# Patient Record
Sex: MALE | URBAN - METROPOLITAN AREA
[De-identification: names, ages, dates, MRNs, and addresses within clinical notes are randomized per-mention and may not be internally consistent; named-entity substitution may affect disease eponyms.]

---

## 2017-03-30 PROBLEM — 422400008 VOMITING: Status: ACTIVE | Noted: 2017-03-30

## 2017-04-17 PROBLEM — 196735001 CSG - CHRONIC SUPERFICIAL GASTRITIS: Status: ACTIVE | Noted: 2017-04-17

## 2017-09-14 PROBLEM — 271737000 ANEMIA: Status: ACTIVE | Noted: 2017-03-30

## 2018-02-22 PROBLEM — 428283002 HISTORY OF POLYP OF COLON (SITUATION): Status: ACTIVE | Noted: 2018-02-22

## 2018-04-05 PROBLEM — 39772007 RECTAL POLYP: Status: ACTIVE | Noted: 2018-04-05

## 2018-04-05 PROBLEM — 91985000 BENIGN NEOPLASM OF ASCENDING COLON: Status: ACTIVE | Noted: 2018-04-05

## 2018-04-05 PROBLEM — 92343006 BENIGN NEOPLASM OF SIGMOID COLON: Status: ACTIVE | Noted: 2018-04-05

## 2018-04-05 PROBLEM — 166318006 BLOOD CHEMISTRY ABNORMAL: Status: ACTIVE | Noted: 2017-09-17

## 2018-04-05 PROBLEM — 92448001 BENIGN NEOPLASM OF TRANSVERSE COLON: Status: ACTIVE | Noted: 2018-04-05

## 2018-04-05 PROBLEM — 92076000 BENIGN NEOPLASM OF DESCENDING COLON: Status: ACTIVE | Noted: 2018-04-05

## 2021-07-06 ENCOUNTER — TELEPHONE ENCOUNTER (OUTPATIENT)
Dept: URBAN - METROPOLITAN AREA CLINIC 35 | Facility: CLINIC | Age: 81
End: 2021-07-06

## 2021-07-06 ENCOUNTER — OFFICE VISIT (OUTPATIENT)
Dept: URBAN - METROPOLITAN AREA CLINIC 31 | Facility: CLINIC | Age: 81
End: 2021-07-06

## 2021-07-06 VITALS
WEIGHT: 166 LBS | DIASTOLIC BLOOD PRESSURE: 75 MMHG | OXYGEN SATURATION: 98 % | BODY MASS INDEX: 24.59 KG/M2 | HEIGHT: 69 IN | HEART RATE: 68 BPM | SYSTOLIC BLOOD PRESSURE: 130 MMHG

## 2021-07-06 PROBLEM — 34742003 PORTAL HYPERTENSION: Status: ACTIVE | Noted: 2020-01-13

## 2021-07-06 PROBLEM — 197321007 FATTY LIVER: Status: ACTIVE | Noted: 2020-01-13

## 2021-07-06 PROBLEM — 39839004 DIAPHRAGMATIC HERNIA: Status: ACTIVE | Noted: 2017-09-17

## 2021-07-06 PROBLEM — 14760008: Status: ACTIVE | Noted: 2019-12-19

## 2021-07-06 PROBLEM — 235675006: Status: ACTIVE | Noted: 2019-12-06

## 2021-07-06 PROBLEM — 40930008: Status: ACTIVE | Noted: 2019-12-05

## 2021-07-06 PROBLEM — 88111009 CHANGE IN BOWEL HABIT: Status: ACTIVE | Noted: 2019-11-11

## 2021-07-06 PROBLEM — 274774002 ELEVATED LEVELS OF TRANSAMINASE & LACTIC ACID DEHYDROGENASE: Status: ACTIVE | Noted: 2017-09-17

## 2021-07-06 PROBLEM — 441988000 IMAGING OF ABDOMEN ABNORMAL: Status: ACTIVE | Noted: 2019-12-20

## 2021-07-06 PROBLEM — 2901004: Status: ACTIVE | Noted: 2019-12-19

## 2021-07-06 PROBLEM — 60728008: Status: ACTIVE | Noted: 2019-11-11

## 2021-07-06 PROBLEM — 87522002 IRON DEFICIENCY ANEMIA: Status: ACTIVE | Noted: 2017-09-14

## 2021-07-06 PROBLEM — 442076002 EARLY SATIETY: Status: ACTIVE | Noted: 2017-03-30

## 2021-07-06 RX ORDER — LINACLOTIDE 72 UG/1
1 CAPSULE CAPSULE, GELATIN COATED ORAL ONCE A DAY
Qty: 90 CAPSULE | Refills: 1 | Status: ON HOLD | COMMUNITY
Start: 2019-12-06

## 2021-07-06 RX ORDER — LUBIPROSTONE 8 UG/1
1 CAPSULE WITH FOOD AND WATER CAPSULE, GELATIN COATED ORAL TWICE A DAY
Qty: 60 | Status: ON HOLD | COMMUNITY
Start: 2019-11-13

## 2021-07-06 RX ORDER — HYOSCYAMINE SULFATE 0.125 MG
1 TABLET ON THE TONGUE AND ALLOW TO DISSOLVE  AS NEEDED TABLET,DISINTEGRATING ORAL
Qty: 60 | Refills: 1 | Status: ON HOLD | COMMUNITY
Start: 2019-12-19

## 2021-07-06 RX ORDER — MECOBALAMIN 1000 MCG
AS DIRECTED TABLET,CHEWABLE ORAL
Status: ON HOLD | COMMUNITY

## 2021-07-06 RX ORDER — HYOSCYAMINE SULFATE 0.12 MG/1
1 TABLET AS NEEDED TABLET ORAL
OUTPATIENT
Start: 2019-11-11

## 2021-07-06 RX ORDER — HYOSCYAMINE SULFATE 0.12 MG/1
1 TABLET AS NEEDED TABLET ORAL
Qty: 60 TABLET | Refills: 1 | Status: ON HOLD | COMMUNITY
Start: 2019-11-11

## 2021-07-06 RX ORDER — LEVOTHYROXINE SODIUM 0.15 MG/1
1 TABLET IN THE MORNING ON AN EMPTY STOMACH TABLET ORAL ONCE A DAY
Status: ACTIVE | COMMUNITY

## 2021-07-06 RX ORDER — NITROGLYCERIN 0.4 MG/1
TABLET SUBLINGUAL PRN
Status: ON HOLD | COMMUNITY

## 2021-07-06 RX ORDER — LINACLOTIDE 72 UG/1
1 CAPSULE CAPSULE, GELATIN COATED ORAL ONCE A DAY
OUTPATIENT
Start: 2019-12-06

## 2021-07-06 RX ORDER — LINACLOTIDE 72 UG/1
1 CAPSULE CAPSULE, GELATIN COATED ORAL ONCE A DAY
Qty: 60 | Status: ON HOLD | COMMUNITY
Start: 2019-07-11

## 2021-07-06 RX ORDER — AMIODARONE HYDROCHLORIDE 200 MG/1
1 TABLET TABLET ORAL ONCE A DAY
Qty: 30 | Status: ACTIVE | COMMUNITY

## 2021-07-06 RX ORDER — PANTOPRAZOLE SODIUM 40 MG/1
1 TABLET TABLET, DELAYED RELEASE ORAL ONCE A DAY
Status: ACTIVE | COMMUNITY

## 2021-07-06 RX ORDER — PANTOPRAZOLE SODIUM 40 MG/1
1 TABLET TABLET, DELAYED RELEASE ORAL ONCE A DAY
OUTPATIENT

## 2021-07-06 RX ORDER — ATORVASTATIN CALCIUM 80 MG/1
TABLET, FILM COATED ORAL
Qty: 90 UNSPECIFIED | Status: ACTIVE | COMMUNITY

## 2021-07-06 NOTE — HPI-MIGRATED HPI
Melena : Patient denies any episodes of melena since his last office visit.   Last visit (1/13/2020) Denies any episodes since Dec. 19, 2019.   Last visit (2019) Admit black colored  stools since the past one week .  He denies ingestion of iron and pepto bismol.;

## 2021-07-06 NOTE — HPI-MIGRATED HPI
;   ;   ;   ;   ;   ;   ;   ;   ;     Anemia : Patient admits any recent labs completed since his last office visit.   Last visit (1/13/2020)  Most recent labs completed at [facility] (12/24/2019) RBC (Low) 3.87, Hgb (Low) 13.5, Hct (Low) 41.1   Last visit (12/5/2019)Denies any labs since Oct. 17, 2019.  Last visit (11/26/2019) Most recent labs completed with PCP on Oct. 17, 2019 with Thyroid levels only.  Denies fatigue, dizziness/lightheadedness, melena, blood in stool or abdominal pain.   Outside Labs:PCP (8/8/2019)Iron (Low) at 28, Sat % (Low) at 6, Iron Binding Cap (WNL) 435.   Last visit (11/11/2019)  Patient presents today for a follow up of anemia, which he was initially found to have via labs at [facility] ER April 6, 2015, at which time patient presented with complaints of heart palpitations.     Most recent labs with PCP Oct. 2019 Hgb 13.5  (reported by patient) (results requested).  Denies fatigue, dizziness/lightheadedness, melena, blood in stool or abdominal pain. Patient d/c the Ferrous Sulfate Tablet, 325 (65 Fe) MG 2 months ago due to it causing constipation.    Last visit (7/11/2019)  Patient has a history of anemia.    Last visit (11/1/2018) Denies fatigue, dizziness/lightheadedness, melena, blood in stool or abdominal pain. Patient continues to take Ferrous Sulfate Tablet, 325 (65 Fe) MG.    04/05/2018 He continue to take Ferrous Iron 325 mg and Vitamin B12 1 QD once daily. Most recent labs with PCP 2/17/2018 with Hem.A1C, CMP. Feb. 1, 2018 with CBC: Hgb (Low) at 13.0.  Denies fatigue, dizziness/lightheadedness, melena, blood in stool or abdominal pain.  Admit having 1 bowel movement per day with normal formed stools. Denies melena, blood or in stools.  Stools are really dark, which he attribute to the use of iron daily.   At last visit (2/22/2018)He presented to [facility] Nov. 14, 2017 with chest pain. During his stay his Hgb was Low at 6.  He admit having two blood transfusions, B12 injection x 2, colonoscopy, EGD and capsule endoscopy.  Most recent labs with PCP Feb. 16, 2018 with Hgb (WNL) at 13 and Iron levels were within normal limits  (reported by patient).  He was also admitted in October for chest pain and was evaluated by Dr Chucky Morgan   He has been taking Ferrous Iron 325 mg and Vitamin B12 1 QD once daily since d/c from hospital.   Denies symptoms of fatigue or dizziness/lightheadedness, denies melena. Denies blood in stool. He denies abdominal pain.  Admit having 1 bowel movement per day with normal formed stools. Denies melena, blood or in stools.  Stools are really dark, which he attribute to the use of iron daily.  At last visit (9/14/2017) Patient denies symptoms of fatigue or dizziness/lightheadedness, denies melena. Denies blood in stool. He denies abdominal pain. Patient is taking Geritol Tonic daily for vitamin and iron supplementation, though he states he has not been taking it regularly as of late.   Visit (7/16/2015) Patient presented to [facility] ER on April 6, 2015 with complaints of palpitations. He mentioned that he had suffered nausea and vomiting from food poisoning 3-4 nights prior. Routine labs showed patient to be anemic. He followed up with his pcp, Dr. Hinojosa, who ran labs and patient's hemoglobin was reportedly at a low of 8.   Patient began taking a Hume vitamin to help with anemia. About 3 weeks ago, patient had follow up labs once again, and hemoglobin continued to be low. He presents today for consultation. He does take Ibuprofen 200 mg 4 tabs po bid once a month for gout          Patient denies symptoms of fatigue or dizziness/lightheadedness since follow up visit with his pcp, or any melena ever.    LABS (4/6/15) CBC showed RBC LOW at 2.91, Hemoglobin LOW at 10.1, Hematocrit LOW at 28.5, and MCH HIGH at 34.7; CMP showed BUN HIGH at 31, Creatinine LOW at 0.7, Potassium LOW at 3.0, Chloride LOW at 98, AST HIGH at 49, and BUN/Creatinine Ratio HIGH at 44.;   Early Satiety : Patient denies continued episodes of early satiety. Patient admits that he eats a whole lot less than before. He also stated that he is not sure if he gets full fast, he's just aware that he's been eating small meals.   Last visit (1/13/2020)  He continue to admit early satiety since his last office visit.    Last visit (12/19/2019) He admits that he still experience early satiety.   Last visit (12/5/2019)He continue to experience symptoms throughout the day.   Last visit (11/26/2019) Patient presents today to review Gastric Emptying study and follow up of early satiety.  He continue to experience symptoms throughout the day.   Last visit (11/11/2019) Continue to admit continuous fullness sensation.   Last visit (7/11/2019) Patient admits that he always feels full even when he hasn't eaten.   Last visit (11/1/2018) Patient states he is able to consume a small meal without complication.   Last visit (4/5/2018) He continue to eat normal meals throughout the day, but not large as he used too.    At last visit (2/22/2018) He has been eating normal meals throughout the day, but not large as he used to.    At last visit (9/14/2017)Patient continues to admit early satiety, with onset over 6 years ago. He states that has never been able to eat large amounts.  He does have abdominal distention possibly due to the weight gain;   Bloating/Gas : 81 year old male patient presents today for a follow up of bloating and gas. Patient admits continuous episdoes since his last office visit. Patient admits symptoms are more present at night while patient is asleep. He states that it usually wakes him up from his sleep.  Patient admits symptoms have been present since 4-5 months. Patient admits taking any OTC Pepcid, baking soda with water, Gas -X, Gas relief and Pepto- bismol to relieve symptoms.   Patient admits to having some labs done back in March of 2021, not what was initially.   Patient denies having any previous breath test including H. Pylori or SIBO    Last visit (1/13/2020)  Patient presents today for a follow up of an EGD performed by Dr. Karin Lowe.  He denies any complications after his procedure.    Admit 50% gradual improvement of bloating and gas. He will occasionally take 2 Tums with relief.   He has not taken Hyoscyamine since he has not experience abdominal pain or cramping.    Last visit (12/19/2019) Patient presents today to review CT and follow up of bloating and gas.  He takes a [facility] brand heartburn medication     Last visit (12/5/2019)He continue to experience generalized abdominal distention and increase gas.  Described as abdominal distention with sensation of fullness and tightness which is painful.   Last visit (11/26/2019) He continue to experience generalized abdominal distention and increase gas.   Described as abdominal fullness throughout the day.  Denies any aggravating factors.  Last visit (11/11/2019) Continue to experience generalized abdominal distention and increase gas.  Denies any aggravating factors.    He will take 2 Gas-X when he "feel uncomfortable" with mild relief.    Last visit (7/11/2019) Patient admits bloating and he denies any N/V.;   Nausea/Vomiting : Patient admits to episodes of nausea/vomiting over the past 2 months which occured twice.    Last visit (1/13/2020) He continue to deny any episodes of nausea or vomiting.  Last visit (12/19/2019) Patient denies any episodes of nausea or vomiting since his last office visit.   Last visit (12/5/2019)Continue to deny episodes of nausea or vomiting.   Last visit (11/26/2019) Continue to deny episodes of nausea or vomiting.   Last visit (11/11/2019) Continue to deny episodes of nausea or vomiting.    Last visit (7/11/2019) Patient admits that N/V has resolved.   Last visit (11/1/2018) Patient denies recent episodes of emesis.  Last visit (4/5/2018) Continue to deny episodes of emesis.  At last visit (2/22/2018) Continue to deny episodes of emesis.   At last visit (9/14/2017)Patient admits 1 episode of emesis last night. Denies any nausea. While he was laying in bed he felt liquid coming up his throat. He states he had eaten a spicy soup earlier in the day.   Denies any other episodes like this in the past 6 months.;   Carrasco's Esophagus : Patient admits any associated symptoms of Carrasco's Esophagus at this time. Patient denies symptoms are controlled with the use of Pantoprazole 40 mg QD.   Last visit (1/13/2020)  Patient denies any associated symptoms Carrasco's Esophagus at this time. He continue to take Pantoprazole 40 mg QD.   Last visit (12/19/2019) He admits the use of Pantoprazole 40 mg QD to help control his symptoms.   Last visit (12/5/2019)He continues to take Pantoprazole 40 mg po qd. Denies heartburn or reflux symptoms.   Last visit (11/11/2019)  He continues to take Pantoprazole 40 mg po qd. Denies heartburn or reflux symptoms.   Last visit (7/11/2019)  He continues to take Pantoprazole 40 mg po qd.    Last visit (11/1/2018)  Patient presents today for a follow up of Carrasco's esophagus, which he was fond to have via EGD performed (12/20/2014). He denies recent symptoms. He continues to take Pantoprazole 40 mg po qd.    Last visit (4/5/2018) Continue to admit control of symptoms with the use of Pantoprazole 40 mg daily. Denies heartburn/reflux symptoms.   At last visit (2/22/2018) Continue to take Pantoprazole 40 mg daily.  Denies heartburn/reflux symptoms.  At last visit (9/14/2017)Patient continues to take Pantoprazole 40 mg daily with good control of acid reflux symptoms. He denies any symptoms of heartburn, chest pains, or dysphagia. Denies epigastric pain.;   Cough : Patient denies a continued cough since his last office visit.    Last visit (1/13/2020)  He admit less frequent episodes since last  visit.   Last visit (12/19/2019) Patient admits that he will only cough in the morning and he feels that this is associated with sinus drainage. He denies any cough later in the day.   Last visit (12/5/2019)Denies coughing episodes since last visit.  Last visit (11/26/2019) Denies coughing episodes since last visit.  Last visit (11/11/2019) Admit periodic cough since last visit.  Had a Chest Xray 2 weeks ago with normal findings (reported by patient), due to cough and chest congestion.  Subsequently treated with a Zpack.     Last visit (7/11/2019) Patient admits that his cough is getting better.  Last visit (11/1/2018) Patient marks improvement of coughing episodes. He states attributes symptoms to allergies and symptoms only occur at night.  Last visit (4/5/2018) Continue to admit intermittent coughing episodes, which he attribute to post nasal drainage associated with his seasonal allergies.  He is taking Claritin and a decongestant with improvement    At last visit (2/22/2018) Admit intermittent coughing episodes over the past month, which he attribute to post nasal drainage associated with his seasonal allergies.  At last visit (9/14/2017)Patient admits intermittent cough with associated globus and mucus production in the throat.;   Abnormal Liver Function : Patient admits any recent labs completed since his last office visit.   Last visit (1/13/2020)  Most recent labs showing elevated liver enzymes completed at [facility] (12/20/2019) with AST (High) at 45, ALT 47, Alk phos 85, Total Bilirubin 0.83 3 glasses of wine every night since the past 5 years. Had been drinking vodka and scotch daily - 3 drinks / night - 20 years    Last visit (12/19/2019) Patient has a history of elevated liver enzymes.  Denies any labs since Oct. 17, 2019.   Last visit (11/26/2019) Most recent labs completed with PCP on Oct. 17, 2019 with Thyroid levels only.    Last visit (11/11/2019) Patient has a history of elevated liver enzymes.  Most recent labs completed with PCP in Oct. 2019.  Continue to deny symptoms of jaundice, chills, RUQ pains, dizziness, fatigue.  Admits longstanding history of easy bruising. Patient does take Plavix 75 mg daily.     Last visit (7/11/2019) Patient has a history of elevated liver enzymes.   Last visit (11/1/2018)  Patient denies having recent labs completed. Patient currently jaundice, chills, RUQ pains, dizziness, fatigue.  Admits longstanding history of easy bruising. Patient does take Plavix 75 mg daily.    04/05/2018 Patient's labs from 3/31/17 revealed elevated liver enzymes. Records from Dr. Hinojosa' office show elevated liver enzymes as far back as July 2016.  Patient currently jaundice, chills, RUQ pains, dizziness, fatigue.  Admits longstanding history of easy bruising. Patient does take Plavix 75 mg daily.   RISK FACTORS:   Patient admits nightly alcohol consumption for the past 30 years- usually a few beers and a glass of red wine. 10+ years ago he would drink scotch and vodka. Denies drugs, Denies travel outside the [place]. He does use Aspirin 81 mg qd. He uses BC powder (Aspirin) about twice per week for hip pains. Denies protein supplements, Denies tattoos, Denies piercing's, Admits  service in the [employer] from [id number] though he was never deployed.  Denies blood transfusion, Denies family history of liver disease or cancer, Denies personal exposure to liver diseases, Denies halfway, Denies rehab.;   Change in Bowel habits : Patient admits bowel habits have returned to normal since his last office visit. Patient reports 1 bowel movement a day. Stools are normal and formed. Patient denies any episodes of rectal bleeding, melena, or mucus at this time.   Last visit (1/13/2020)  Currently admit 2-3 bowel movements at leat 4 days a week with the use of Linzess 72 mcg 1 QD.  Stools are normal and formed to loose pieces.  Denies melena, blood or mucus in stools.    Last visit (12/19/2019) Patient admits that his bowel movements are returning to normal. Patient admits that he will have 1-2 bowel movements a day. He reports that he still has to strain occasionally. He admits that his stools are form. Patient admits the use of Linzess 72 mcg 1 QD.  Last visit (12/5/2019) Patient presents today with worsening constipation.  He report having 1 incomplete strenuous bowel movement every 2-3 days.  Stools are hard and semi-formed.  Denies melena, blood or mucus in stools.  He admit associated generalized abdominal distention with sensation of fullness and tightness.  Admit increase gas.    He has been taking the samples of Amitiza 24 MCG, 1 BID without benefit, so he stopped taking them.    Last visit (11/26/2019) He continue to admit 1 incomplete strenuous bowel movement twice a week.  Stools are hard pebble-like or hard semi formed.  Denies melena, blood or mucus in stools.  He completed samples of Amitiza 8 MCG, 1 BID x 8 days without benefit.  Last visit (11/11/2019) Currently admit 1 incomplete strenuous bowel movement twice a week.  Stools are hard pebble-like or hard semi formed.  Denies melena, blood or mucus in stools.  He tried the Linzess 72 mcg, 1 QD, but d/c due to "explosive diarrhea". He has not taken any medication for symptoms over the past three months    Last visit (7/11/2019) Patient presents today for a consultation of change in bowel habits since the past one month.   Will be starting a new medication for CAD / PVD to replace Plavix which was started about 4 weeks ago   Patient admits 1 BM a week. Patient admits that stools are hard and pebble like. Patient denies any rectal bleeding, melena, rectal pain, or pruritus ani.   Patient admits taking a laxative 2 days ago and he was able to have a BM yesterday.   Patient admits continued use of fiber pills daily.;

## 2021-07-08 ENCOUNTER — TELEPHONE ENCOUNTER (OUTPATIENT)
Dept: URBAN - METROPOLITAN AREA CLINIC 35 | Facility: CLINIC | Age: 81
End: 2021-07-08

## 2021-07-08 RX ORDER — HYOSCYAMINE SULFATE 0.12 MG/1
1 TABLET AS NEEDED TABLET ORAL
Qty: 120 TABLET | Refills: 0 | OUTPATIENT
Start: 2019-11-11

## 2021-09-08 ENCOUNTER — TELEPHONE ENCOUNTER (OUTPATIENT)
Dept: URBAN - METROPOLITAN AREA CLINIC 35 | Facility: CLINIC | Age: 81
End: 2021-09-08

## 2021-09-09 ENCOUNTER — OFFICE VISIT (OUTPATIENT)
Dept: URBAN - METROPOLITAN AREA CLINIC 33 | Facility: CLINIC | Age: 81
End: 2021-09-09

## 2021-09-09 VITALS
SYSTOLIC BLOOD PRESSURE: 132 MMHG | HEART RATE: 69 BPM | DIASTOLIC BLOOD PRESSURE: 60 MMHG | BODY MASS INDEX: 24.44 KG/M2 | OXYGEN SATURATION: 94 % | WEIGHT: 165 LBS | HEIGHT: 69 IN

## 2021-09-09 RX ORDER — LINACLOTIDE 72 UG/1
1 CAPSULE CAPSULE, GELATIN COATED ORAL ONCE A DAY
Qty: 60 | Status: ON HOLD | COMMUNITY
Start: 2019-07-11

## 2021-09-09 RX ORDER — HYOSCYAMINE SULFATE 0.12 MG/1
1 TABLET AS NEEDED TABLET ORAL
Qty: 120 TABLET | Refills: 0 | OUTPATIENT

## 2021-09-09 RX ORDER — HYOSCYAMINE SULFATE 0.12 MG/1
1 TABLET AS NEEDED TABLET ORAL
Qty: 120 TABLET | Refills: 0 | Status: ACTIVE | COMMUNITY
Start: 2019-11-11

## 2021-09-09 RX ORDER — LUBIPROSTONE 8 UG/1
1 CAPSULE WITH FOOD AND WATER CAPSULE, GELATIN COATED ORAL TWICE A DAY
Qty: 60 | Status: ON HOLD | COMMUNITY
Start: 2019-11-13

## 2021-09-09 RX ORDER — AMIODARONE HYDROCHLORIDE 200 MG/1
1 TABLET TABLET ORAL ONCE A DAY
Qty: 30 | Status: ACTIVE | COMMUNITY

## 2021-09-09 RX ORDER — LEVOTHYROXINE SODIUM 0.15 MG/1
1 TABLET IN THE MORNING ON AN EMPTY STOMACH TABLET ORAL ONCE A DAY
Status: ACTIVE | COMMUNITY

## 2021-09-09 RX ORDER — LINACLOTIDE 72 UG/1
1 CAPSULE CAPSULE, GELATIN COATED ORAL ONCE A DAY
Status: ON HOLD | COMMUNITY
Start: 2019-12-06

## 2021-09-09 RX ORDER — ATORVASTATIN CALCIUM 80 MG/1
TABLET, FILM COATED ORAL
Qty: 90 UNSPECIFIED | Status: ACTIVE | COMMUNITY

## 2021-09-09 RX ORDER — NITROGLYCERIN 0.4 MG/1
TABLET SUBLINGUAL PRN
Status: ON HOLD | COMMUNITY

## 2021-09-09 RX ORDER — PANTOPRAZOLE SODIUM 40 MG/1
1 TABLET TABLET, DELAYED RELEASE ORAL ONCE A DAY
Status: ACTIVE | COMMUNITY

## 2021-09-09 RX ORDER — MECOBALAMIN 1000 MCG
AS DIRECTED TABLET,CHEWABLE ORAL
Status: ON HOLD | COMMUNITY

## 2021-09-09 RX ORDER — HYOSCYAMINE SULFATE 0.125 MG
1 TABLET ON THE TONGUE AND ALLOW TO DISSOLVE  AS NEEDED TABLET,DISINTEGRATING ORAL
Qty: 60 | Refills: 1 | Status: ON HOLD | COMMUNITY
Start: 2019-12-19

## 2021-09-09 RX ORDER — PANTOPRAZOLE SODIUM 40 MG/1
1 TABLET TABLET, DELAYED RELEASE ORAL ONCE A DAY
Qty: 90 TABLET | Refills: 1 | OUTPATIENT

## 2021-09-09 NOTE — HPI-MIGRATED HPI
;   ;   ;   ;   ;   ;   ;   ;   ;   ;   ;     Anemia : His last labs were completed 8/16/2021 with his PCP, Celiac Panel, Iron studies, and Alpha Fet was not completed at that time.    Patient admits any recent labs completed since his last office visit.   Last visit (1/13/2020)  Most recent labs completed at [facility] (12/24/2019) RBC (Low) 3.87, Hgb (Low) 13.5, Hct (Low) 41.1   Last visit (12/5/2019)Denies any labs since Oct. 17, 2019.  Last visit (11/26/2019) Most recent labs completed with PCP on Oct. 17, 2019 with Thyroid levels only.  Denies fatigue, dizziness/lightheadedness, melena, blood in stool or abdominal pain.   Outside Labs:PCP (8/8/2019)Iron (Low) at 28, Sat % (Low) at 6, Iron Binding Cap (WNL) 435.   Last visit (11/11/2019)  Patient presents today for a follow up of anemia, which he was initially found to have via labs at [facility] ER April 6, 2015, at which time patient presented with complaints of heart palpitations.     Most recent labs with PCP Oct. 2019 Hgb 13.5  (reported by patient) (results requested).  Denies fatigue, dizziness/lightheadedness, melena, blood in stool or abdominal pain. Patient d/c the Ferrous Sulfate Tablet, 325 (65 Fe) MG 2 months ago due to it causing constipation.    Last visit (7/11/2019)  Patient has a history of anemia.    Last visit (11/1/2018) Denies fatigue, dizziness/lightheadedness, melena, blood in stool or abdominal pain. Patient continues to take Ferrous Sulfate Tablet, 325 (65 Fe) MG.    04/05/2018 He continue to take Ferrous Iron 325 mg and Vitamin B12 1 QD once daily. Most recent labs with PCP 2/17/2018 with Hem.A1C, CMP. Feb. 1, 2018 with CBC: Hgb (Low) at 13.0.  Denies fatigue, dizziness/lightheadedness, melena, blood in stool or abdominal pain.  Admit having 1 bowel movement per day with normal formed stools. Denies melena, blood or in stools.  Stools are really dark, which he attribute to the use of iron daily.   At last visit (2/22/2018)He presented to [facility] Nov. 14, 2017 with chest pain. During his stay his Hgb was Low at 6.  He admit having two blood transfusions, B12 injection x 2, colonoscopy, EGD and capsule endoscopy.  Most recent labs with PCP Feb. 16, 2018 with Hgb (WNL) at 13 and Iron levels were within normal limits  (reported by patient).  He was also admitted in October for chest pain and was evaluated by Dr Chucky Morgan   He has been taking Ferrous Iron 325 mg and Vitamin B12 1 QD once daily since d/c from hospital.   Denies symptoms of fatigue or dizziness/lightheadedness, denies melena. Denies blood in stool. He denies abdominal pain.  Admit having 1 bowel movement per day with normal formed stools. Denies melena, blood or in stools.  Stools are really dark, which he attribute to the use of iron daily.  At last visit (9/14/2017) Patient denies symptoms of fatigue or dizziness/lightheadedness, denies melena. Denies blood in stool. He denies abdominal pain. Patient is taking Geritol Tonic daily for vitamin and iron supplementation, though he states he has not been taking it regularly as of late.   Visit (7/16/2015) Patient presented to [facility] ER on April 6, 2015 with complaints of palpitations. He mentioned that he had suffered nausea and vomiting from food poisoning 3-4 nights prior. Routine labs showed patient to be anemic. He followed up with his pcp, Dr. Hinojosa, who ran labs and patient's hemoglobin was reportedly at a low of 8.   Patient began taking a [place] vitamin to help with anemia. About 3 weeks ago, patient had follow up labs once again, and hemoglobin continued to be low. He presents today for consultation. He does take Ibuprofen 200 mg 4 tabs po bid once a month for gout          Patient denies symptoms of fatigue or dizziness/lightheadedness since follow up visit with his pcp, or any melena ever.    LABS (4/6/15) CBC showed RBC LOW at 2.91, Hemoglobin LOW at 10.1, Hematocrit LOW at 28.5, and MCH HIGH at 34.7; CMP showed BUN HIGH at 31, Creatinine LOW at 0.7, Potassium LOW at 3.0, Chloride LOW at 98, AST HIGH at 49, and BUN/Creatinine Ratio HIGH at 44.;   Early Satiety : Patient admits occasional episodes of continued early satiety, but admits improvement. Patient admits that he eats a whole lot less than before.  Last visit (1/13/2020)  He continue to admit early satiety since his last office visit.    Last visit (12/19/2019) He admits that he still experience early satiety.   Last visit (12/5/2019)He continue to experience symptoms throughout the day.   Last visit (11/26/2019) Patient presents today to review Gastric Emptying study and follow up of early satiety.  He continue to experience symptoms throughout the day.   Last visit (11/11/2019) Continue to admit continuous fullness sensation.   Last visit (7/11/2019) Patient admits that he always feels full even when he hasn't eaten.   Last visit (11/1/2018) Patient states he is able to consume a small meal without complication.   Last visit (4/5/2018) He continue to eat normal meals throughout the day, but not large as he used too.    At last visit (2/22/2018) He has been eating normal meals throughout the day, but not large as he used to.    At last visit (9/14/2017)Patient continues to admit early satiety, with onset over 6 years ago. He states that has never been able to eat large amounts.  He does have abdominal distention possibly due to the weight gain;   Bloating/Gas : Patient presents today for a follow up of bloating/gas. He admits improvment in episodes of bloating/gas.   Last visit (7/6/2021)  81 year old male patient presents today for a follow up of bloating and gas. Patient admits continuous episdoes since his last office visit. Patient admits symptoms are more present at night while patient is asleep. He states that it usually wakes him up from his sleep.  Patient admits symptoms have been present since 4-5 months. Patient admits taking any OTC Pepcid, baking soda with water, Gas -X, Gas relief and Pepto- bismol to relieve symptoms.   Patient admits to having some labs done back in March of 2021, not what was initially.   Patient denies having any previous breath test including H. Pylori or SIBO    Last visit (1/13/2020)  Patient presents today for a follow up of an EGD performed by Dr. Karin Lowe.  He denies any complications after his procedure.    Admit 50% gradual improvement of bloating and gas. He will occasionally take 2 Tums with relief.   He has not taken Hyoscyamine since he has not experience abdominal pain or cramping.    Last visit (12/19/2019) Patient presents today to review CT and follow up of bloating and gas.  He takes a [employer] brand heartburn medication     Last visit (12/5/2019)He continue to experience generalized abdominal distention and increase gas.  Described as abdominal distention with sensation of fullness and tightness which is painful.   Last visit (11/26/2019) He continue to experience generalized abdominal distention and increase gas.   Described as abdominal fullness throughout the day.  Denies any aggravating factors.  Last visit (11/11/2019) Continue to experience generalized abdominal distention and increase gas.  Denies any aggravating factors.    He will take 2 Gas-X when he "feel uncomfortable" with mild relief.    Last visit (7/11/2019) Patient admits bloating and he denies any N/V.;   Gastroparesis : He was found to have gastroparesis via Gastric Emptying study performed on 11/25/2019 revealing significantly delayed emptying.   He denies making any dietary modifications.     ;   Nausea/Vomiting : He denies any episodes of nausea/vomiting at this time.     Patient admits to episodes of nausea/vomiting over the past 2 months which occured twice.    Last visit (1/13/2020) He continue to deny any episodes of nausea or vomiting.  Last visit (12/19/2019) Patient denies any episodes of nausea or vomiting since his last office visit.   Last visit (12/5/2019)Continue to deny episodes of nausea or vomiting.   Last visit (11/26/2019) Continue to deny episodes of nausea or vomiting.   Last visit (11/11/2019) Continue to deny episodes of nausea or vomiting.    Last visit (7/11/2019) Patient admits that N/V has resolved.   Last visit (11/1/2018) Patient denies recent episodes of emesis.  Last visit (4/5/2018) Continue to deny episodes of emesis.  At last visit (2/22/2018) Continue to deny episodes of emesis.   At last visit (9/14/2017)Patient admits 1 episode of emesis last night. Denies any nausea. While he was laying in bed he felt liquid coming up his throat. He states he had eaten a spicy soup earlier in the day.   Denies any other episodes like this in the past 6 months.;   Carrasco's Esophagus : Patient admits any associated symptoms of Carrasco's Esophagus at this time. Patient admits symptoms are controlled with the use of Pantoprazole 40 mg QD.   Last visit (1/13/2020)  Patient denies any associated symptoms Carrasco's Esophagus at this time. He continue to take Pantoprazole 40 mg QD.   Last visit (12/19/2019) He admits the use of Pantoprazole 40 mg QD to help control his symptoms.   Last visit (12/5/2019)He continues to take Pantoprazole 40 mg po qd. Denies heartburn or reflux symptoms.   Last visit (11/11/2019)  He continues to take Pantoprazole 40 mg po qd. Denies heartburn or reflux symptoms.   Last visit (7/11/2019)  He continues to take Pantoprazole 40 mg po qd.    Last visit (11/1/2018)  Patient presents today for a follow up of Carrasco's esophagus, which he was fond to have via EGD performed (12/20/2014). He denies recent symptoms. He continues to take Pantoprazole 40 mg po qd.    Last visit (4/5/2018) Continue to admit control of symptoms with the use of Pantoprazole 40 mg daily. Denies heartburn/reflux symptoms.   At last visit (2/22/2018) Continue to take Pantoprazole 40 mg daily.  Denies heartburn/reflux symptoms.  At last visit (9/14/2017)Patient continues to take Pantoprazole 40 mg daily with good control of acid reflux symptoms. He denies any symptoms of heartburn, chest pains, or dysphagia. Denies epigastric pain.;   Cough : Patient denies a continued cough since his last office visit.    Last visit (1/13/2020)  He admit less frequent episodes since last  visit.   Last visit (12/19/2019) Patient admits that he will only cough in the morning and he feels that this is associated with sinus drainage. He denies any cough later in the day.   Last visit (12/5/2019)Denies coughing episodes since last visit.  Last visit (11/26/2019) Denies coughing episodes since last visit.  Last visit (11/11/2019) Admit periodic cough since last visit.  Had a Chest Xray 2 weeks ago with normal findings (reported by patient), due to cough and chest congestion.  Subsequently treated with a Zpack.     Last visit (7/11/2019) Patient admits that his cough is getting better.  Last visit (11/1/2018) Patient marks improvement of coughing episodes. He states attributes symptoms to allergies and symptoms only occur at night.  Last visit (4/5/2018) Continue to admit intermittent coughing episodes, which he attribute to post nasal drainage associated with his seasonal allergies.  He is taking Claritin and a decongestant with improvement    At last visit (2/22/2018) Admit intermittent coughing episodes over the past month, which he attribute to post nasal drainage associated with his seasonal allergies.  At last visit (9/14/2017)Patient admits intermittent cough with associated globus and mucus production in the throat.;   Abnormal Liver Function : Patient admits any recent labs completed since his last office visit.   Last visit (1/13/2020)  Most recent labs showing elevated liver enzymes completed at [facility] (12/20/2019) with AST (High) at 45, ALT 47, Alk phos 85, Total Bilirubin 0.83 3 glasses of wine every night since the past 5 years. Had been drinking vodka and scotch daily - 3 drinks / night - 20 years    Last visit (12/19/2019) Patient has a history of elevated liver enzymes.  Denies any labs since Oct. 17, 2019.   Last visit (11/26/2019) Most recent labs completed with PCP on Oct. 17, 2019 with Thyroid levels only.    Last visit (11/11/2019) Patient has a history of elevated liver enzymes.  Most recent labs completed with PCP in Oct. 2019.  Continue to deny symptoms of jaundice, chills, RUQ pains, dizziness, fatigue.  Admits longstanding history of easy bruising. Patient does take Plavix 75 mg daily.     Last visit (7/11/2019) Patient has a history of elevated liver enzymes.   Last visit (11/1/2018)  Patient denies having recent labs completed. Patient currently jaundice, chills, RUQ pains, dizziness, fatigue.  Admits longstanding history of easy bruising. Patient does take Plavix 75 mg daily.    04/05/2018 Patient's labs from 3/31/17 revealed elevated liver enzymes. Records from Dr. Hinojosa' office show elevated liver enzymes as far back as July 2016.  Patient currently jaundice, chills, RUQ pains, dizziness, fatigue.  Admits longstanding history of easy bruising. Patient does take Plavix 75 mg daily.   RISK FACTORS:   Patient admits nightly alcohol consumption for the past 30 years- usually a few beers and a glass of red wine. 10+ years ago he would drink scotch and vodka. Denies drugs, Denies travel outside the [place]. He does use Aspirin 81 mg qd. He uses BC powder (Aspirin) about twice per week for hip pains. Denies protein supplements, Denies tattoos, Denies piercing's, Admits  service in the [employer] from [id number] though he was never deployed.  Denies blood transfusion, Denies family history of liver disease or cancer, Denies personal exposure to liver diseases, Denies [facility], Denies rehab.;   Melena : He reports that he has had melena previously but it is due to what he eats. ;   Change in Bowel habits : He reports that his bowel habits have improved. 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 He states now symptoms have improved and he will take a stool softener 1-2 times a week.   Currently has 1 bowel movement a day or every other day with occasional strain. His stools are alternate between firm and occasional soft without blood or mucus.   Patient has incorporated more fiber to his diet.   Last visit (7/6/2021)  Patient admits bowel habits have returned to normal since his last office visit. Patient reports 1 bowel movement a day. Stools are normal and formed. Patient denies any episodes of rectal bleeding, melena, or mucus at this time.   Last visit (1/13/2020)  Currently admit 2-3 bowel movements at leat 4 days a week with the use of Linzess 72 mcg 1 QD.  Stools are normal and formed to loose pieces.  Denies melena, blood or mucus in stools.    Last visit (12/19/2019) Patient admits that his bowel movements are returning to normal. Patient admits that he will have 1-2 bowel movements a day. He reports that he still has to strain occasionally. He admits that his stools are form. Patient admits the use of Linzess 72 mcg 1 QD.  Last visit (12/5/2019) Patient presents today with worsening constipation.  He report having 1 incomplete strenuous bowel movement every 2-3 days.  Stools are hard and semi-formed.  Denies melena, blood or mucus in stools.  He admit associated generalized abdominal distention with sensation of fullness and tightness.  Admit increase gas.    He has been taking the samples of Amitiza 24 MCG, 1 BID without benefit, so he stopped taking them.    Last visit (11/26/2019) He continue to admit 1 incomplete strenuous bowel movement twice a week.  Stools are hard pebble-like or hard semi formed.  Denies melena, blood or mucus in stools.  He completed samples of Amitiza 8 MCG, 1 BID x 8 days without benefit.  Last visit (11/11/2019) Currently admit 1 incomplete strenuous bowel movement twice a week.  Stools are hard pebble-like or hard semi formed.  Denies melena, blood or mucus in stools.  He tried the Linzess 72 mcg, 1 QD, but d/c due to "explosive diarrhea". He has not taken any medication for symptoms over the past three months    Last visit (7/11/2019) Patient presents today for a consultation of change in bowel habits since the past one month.   Will be starting a new medication for CAD / PVD to replace Plavix which was started about 4 weeks ago   Patient admits 1 BM a week. Patient admits that stools are hard and pebble like. Patient denies any rectal bleeding, melena, rectal pain, or pruritus ani.   Patient admits taking a laxative 2 days ago and he was able to have a BM yesterday.   Patient admits continued use of fiber pills daily.;   Abdominal Pain : Patient denies any abdominal pain at this time and reports the use of Hyoscyamine 0.125 mg 1 PO QD controls his symptoms.    Last visit (7/6/2021)  Admits generalized abdominal pain   Admits that continued use of Hyoscyamine 0.125 MG 1 PO QID controls his symptoms. ;

## 2021-09-12 PROBLEM — 102614006: Status: ACTIVE | Noted: 2019-12-06

## 2021-09-12 PROBLEM — 302914006 BARRETT'S ESOPHAGUS: Status: ACTIVE | Noted: 2017-03-30

## 2022-01-13 ENCOUNTER — TELEPHONE ENCOUNTER (OUTPATIENT)
Dept: URBAN - METROPOLITAN AREA CLINIC 36 | Facility: CLINIC | Age: 82
End: 2022-01-13

## 2022-01-13 RX ORDER — HYOSCYAMINE SULFATE 0.12 MG/1
1 TABLET AS NEEDED TABLET ORAL
Qty: 120 | Refills: 1

## 2022-03-07 ENCOUNTER — OFFICE VISIT (OUTPATIENT)
Dept: URBAN - METROPOLITAN AREA CLINIC 33 | Facility: CLINIC | Age: 82
End: 2022-03-07

## 2022-04-07 ENCOUNTER — TELEPHONE ENCOUNTER (OUTPATIENT)
Dept: URBAN - METROPOLITAN AREA CLINIC 33 | Facility: CLINIC | Age: 82
End: 2022-04-07